# Patient Record
(demographics unavailable — no encounter records)

---

## 2025-02-12 NOTE — HISTORY OF PRESENT ILLNESS
[FreeTextEntry1] : Annual physical  [de-identified] : 67 year old female presents for an annual physical.   Has Hypothyroidism- on Levothyroxine 100 mcg daily Also with h/o thyroid nodules- last thyroid ultrasound done in May 2022 showed no nodules, but mildly atrophic thyroid gland  Has Hyperlipidemia- on Rosuvastatin 20 mg daily  Has Anxiety- on Lexapro 20 mg daily  Declines Flu vaccine up to date with Shingles vaccine  agrees for a Pneumonia vaccine, no fever   Recently had URI about 2 weeks ago has slight cough otherwise feels well   she has some concerns about her weight she reports exercising regularly- goes to spin classes, weight training  trying to focus on her diet- following with weight watchers

## 2025-02-12 NOTE — HEALTH RISK ASSESSMENT
[Yes] : Yes [Monthly or less (1 pt)] : Monthly or less (1 point) [1 or 2 (0 pts)] : 1 or 2 (0 points) [Never (0 pts)] : Never (0 points) [No] : In the past 12 months have you used drugs other than those required for medical reasons? No [No falls in past year] : Patient reported no falls in the past year [0] : 2) Feeling down, depressed, or hopeless: Not at all (0) [PHQ-2 Negative - No further assessment needed] : PHQ-2 Negative - No further assessment needed [Former] : Former [0-4] : 0-4 [> 15 Years] : > 15 Years [Patient reported mammogram was normal] : Patient reported mammogram was normal [With Family] : lives with family [Retired] : retired [] :  [Fully functional (bathing, dressing, toileting, transferring, walking, feeding)] : Fully functional (bathing, dressing, toileting, transferring, walking, feeding) [Fully functional (using the telephone, shopping, preparing meals, housekeeping, doing laundry, using] : Fully functional and needs no help or supervision to perform IADLs (using the telephone, shopping, preparing meals, housekeeping, doing laundry, using transportation, managing medications and managing finances) [Smoke Detector] : smoke detector [Carbon Monoxide Detector] : carbon monoxide detector [Seat Belt] :  uses seat belt [Sunscreen] : uses sunscreen [With Patient/Caregiver] : , with patient/caregiver [Audit-CScore] : 1 [de-identified] : Exercising regularly  [de-identified] : Diet needs improvement- often craving food  [[id number]] : 0 [Reports changes in hearing] : Reports no changes in hearing [Reports changes in vision] : Reports no changes in vision [MammogramDate] : 05/2023 [PapSmearComments] : s/p hysterectomy  [BoneDensityDate] : 12/2021 [BoneDensityComments] : Osteopenia  [ColonoscopyComments] : Cologuard- negative  [de-identified] : mild memory changes  [AdvancecareDate] : 02/2025

## 2025-02-12 NOTE — ASSESSMENT
[FreeTextEntry1] : Health care maintenance: check blood work, blood drawn in office  follow up with optometry/ophthalmology and dentist for routine exams when due referred to GYN  referred for a mammogram  reports up to date with [facility] for colon cancer screening- will look to get a copy of recent results  declines Flu vaccine Prevnar vaccination administered, patient tolerated well   Hyperlipidemia: previously stable c/w Rosuvastatin improve upon diet, c/w exercise check blood work  Prediabetes: previously stable improve upon diet, c/w exercise trial of Metformin, risks and benefits discussed  check blood work  Overweight: discussed GLP-1 Agonists, advised medications are not covered by Medicare for weight loss patient can check if her secondary insurance covers medications trial of Metformin given h/o pre-diabetes improve upon diet and c/w exercise  Hypothyroidism previously stable c/w Levothyroxine check blood work  Thyroid nodule: referred for updated thyroid ultrasound   Anxiety: stable c/w Lexapro  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

## 2025-03-21 NOTE — HISTORY OF PRESENT ILLNESS
[FreeTextEntry1] : Follow up  [de-identified] : 67 year old female presents for a follow up. She has h/o chronic cough for which she has been referred for imaging, most recently CT chest in December 2023. Lungs had been clear. CT had shown a Morgogni hernia for which she has seen cardiothoracic surgery for an evaluation. She reports seeing an allergist- advised that she does not have asthma. She has been tried on inhalers. She has tried PPI in the past. Currently she reports her cough is occasional and intermittent. No fever. No wheezing.   Has Hypothyroidism- on Levothyroxine 100 mcg daily Also with h/o thyroid nodules- had recent thyroid ultrasound here to review results   Has Hyperlipidemia- on Rosuvastatin 20 mg daily  Has Anxiety- on Lexapro 20 mg daily  Has h/o Pre-diabetes- was tried on Metformin recently, stopped medication due to GI side effects   Had a recent bone density which revealed Osteopenia

## 2025-03-21 NOTE — PHYSICAL EXAM
[No Acute Distress] : no acute distress [Well Nourished] : well nourished [Well Developed] : well developed [Well-Appearing] : well-appearing [Normal Oropharynx] : the oropharynx was normal [Normal Appearance] : was normal in appearance [Neck Supple] : was supple [Clear to Auscultation] : lungs were clear to auscultation bilaterally [No Respiratory Distress] : no respiratory distress  [Normal Rate] : normal rate  [Regular Rhythm] : with a regular rhythm [Normal S1, S2] : normal S1 and S2 [No Murmur] : no murmur heard [Alert and Oriented x3] : oriented to person, place, and time [de-identified] : postnasal drip present

## 2025-03-21 NOTE — REVIEW OF SYSTEMS
[Postnasal Drip] : postnasal drip [Cough] : cough [Fever] : no fever [Chills] : no chills [Chest Pain] : no chest pain [Shortness Of Breath] : no shortness of breath [Wheezing] : no wheezing

## 2025-03-21 NOTE — ASSESSMENT
[FreeTextEntry1] : Chronic cough - recommend over the counter antihistamine such as Xyzal or Claritin - declines ENT referral at this time   Thyroid nodule - reviewed results of thyroid ultrasound with patient - will monitor nodule seen on right lobe  Osteopenia - reviewed results of recent bone density exam with patient - c/w Calcium and Vitamin D3 supplementation - c/w weight bearing exercises as tolerated   Pre-diabetes - recent A1C 6.2 - not able to tolerate Metformin - c/w diet and exercise as tolerated